# Patient Record
(demographics unavailable — no encounter records)

---

## 2025-05-27 NOTE — DISCUSSION/SUMMARY
[FreeTextEntry1] : Hailee is an 18 year old female who presents for employment-related tuberculosis screening with [employer].  - Follow up blood work. No

## 2025-05-27 NOTE — HISTORY OF PRESENT ILLNESS
[de-identified] : blood work [FreeTextEntry6] : --- Hailee reports needing blood work screening for tuberculous as required by her new job with the [employer]. No history of TB, travel abroad, or close contact.